# Patient Record
Sex: MALE | Race: BLACK OR AFRICAN AMERICAN | Employment: FULL TIME | URBAN - METROPOLITAN AREA
[De-identification: names, ages, dates, MRNs, and addresses within clinical notes are randomized per-mention and may not be internally consistent; named-entity substitution may affect disease eponyms.]

---

## 2021-04-05 ENCOUNTER — HOSPITAL ENCOUNTER (EMERGENCY)
Age: 41
Discharge: HOME | End: 2021-04-05
Attending: EMERGENCY MEDICINE
Payer: COMMERCIAL

## 2021-04-05 VITALS
HEART RATE: 86 BPM | TEMPERATURE: 97.8 F | OXYGEN SATURATION: 99 % | RESPIRATION RATE: 14 BRPM | SYSTOLIC BLOOD PRESSURE: 136 MMHG | WEIGHT: 204.59 LBS | BODY MASS INDEX: 31.01 KG/M2 | HEIGHT: 68 IN | DIASTOLIC BLOOD PRESSURE: 82 MMHG

## 2021-04-05 DIAGNOSIS — S01.81XA FACIAL LACERATION, INITIAL ENCOUNTER: Primary | ICD-10-CM

## 2021-04-05 PROCEDURE — 99282 EMERGENCY DEPT VISIT SF MDM: CPT

## 2021-04-05 PROCEDURE — 74011000250 HC RX REV CODE- 250: Performed by: EMERGENCY MEDICINE

## 2021-04-05 PROCEDURE — 74011250636 HC RX REV CODE- 250/636: Performed by: EMERGENCY MEDICINE

## 2021-04-05 PROCEDURE — 90471 IMMUNIZATION ADMIN: CPT

## 2021-04-05 PROCEDURE — 90715 TDAP VACCINE 7 YRS/> IM: CPT | Performed by: EMERGENCY MEDICINE

## 2021-04-05 PROCEDURE — 75810000293 HC SIMP/SUPERF WND  RPR

## 2021-04-05 PROCEDURE — 77030002974 HC SUT PLN J&J -A

## 2021-04-05 RX ORDER — LIDOCAINE HYDROCHLORIDE AND EPINEPHRINE 10; 10 MG/ML; UG/ML
1.5 INJECTION, SOLUTION INFILTRATION; PERINEURAL ONCE
Status: COMPLETED | OUTPATIENT
Start: 2021-04-05 | End: 2021-04-05

## 2021-04-05 RX ADMIN — LIDOCAINE HYDROCHLORIDE,EPINEPHRINE BITARTRATE 15 MG: 10; .01 INJECTION, SOLUTION INFILTRATION; PERINEURAL at 13:58

## 2021-04-05 RX ADMIN — TETANUS TOXOID, REDUCED DIPHTHERIA TOXOID AND ACELLULAR PERTUSSIS VACCINE, ADSORBED 0.5 ML: 5; 2.5; 8; 8; 2.5 SUSPENSION INTRAMUSCULAR at 13:58

## 2021-04-05 NOTE — DISCHARGE INSTRUCTIONS
Please keep your sutured wound clean and dry. You may shower after 24 hours. Be sure to dry the wound thoroughly when it is wet. Please avoid submerging the wound such as in bathtubs, hot tubs, swimming pools, the beach, until the sutures have been removed. After the sutures have been removed you may use sunscreen for the next 3 to 6 months to minimize scarring. Please return to the emergency department immediately for signs of infection such as fever, increasing redness, increasing pain, purulent discharge, or other concerns. Your sutures are dissolvable and should fall out approximately 7 days.   If they do not then please return to the emergency department or to primary care in approximately 10 days for wound check and potential suture removal.

## 2021-04-05 NOTE — ED PROVIDER NOTES
52-year-old gentleman without any significant medical history presents to the emergency department today from home after he opened a car door to hearted to his head suffering a small laceration above his left eye. He denies any other injuries or loss of consciousness. His last tetanus shot was approximately 6-7 years ago. The history is provided by the patient and medical records. Laceration   The incident occurred less than 1 hour ago. The laceration is located on the face. The laceration is 1 cm in size. The injury mechanism is a blunt object. Foreign body present: no. The pain is mild. The pain has been constant since onset. Pertinent negatives include no weakness. The patient's last tetanus shot was 5 to 10 years ago. No past medical history on file. No past surgical history on file. No family history on file.     Social History     Socioeconomic History    Marital status: Not on file     Spouse name: Not on file    Number of children: Not on file    Years of education: Not on file    Highest education level: Not on file   Occupational History    Not on file   Social Needs    Financial resource strain: Not on file    Food insecurity     Worry: Not on file     Inability: Not on file    Transportation needs     Medical: Not on file     Non-medical: Not on file   Tobacco Use    Smoking status: Not on file   Substance and Sexual Activity    Alcohol use: Not on file    Drug use: Not on file    Sexual activity: Not on file   Lifestyle    Physical activity     Days per week: Not on file     Minutes per session: Not on file    Stress: Not on file   Relationships    Social connections     Talks on phone: Not on file     Gets together: Not on file     Attends Sikhism service: Not on file     Active member of club or organization: Not on file     Attends meetings of clubs or organizations: Not on file     Relationship status: Not on file    Intimate partner violence     Fear of current or ex partner: Not on file     Emotionally abused: Not on file     Physically abused: Not on file     Forced sexual activity: Not on file   Other Topics Concern    Not on file   Social History Narrative    Not on file         ALLERGIES: Patient has no known allergies. Review of Systems   Constitutional: Negative for fatigue and fever. HENT: Negative for sneezing and sore throat. Respiratory: Negative for cough and shortness of breath. Cardiovascular: Negative for chest pain and leg swelling. Gastrointestinal: Negative for abdominal pain, diarrhea, nausea and vomiting. Genitourinary: Negative for difficulty urinating and dysuria. Musculoskeletal: Negative for arthralgias and myalgias. Skin: Positive for wound. Negative for color change and rash. Neurological: Negative for weakness and headaches. Psychiatric/Behavioral: Negative for agitation and behavioral problems. There were no vitals filed for this visit. Physical Exam  Vitals signs and nursing note reviewed. Constitutional:       General: He is not in acute distress. Appearance: Normal appearance. He is normal weight. He is not ill-appearing, toxic-appearing or diaphoretic. HENT:      Head: Normocephalic. Nose: Nose normal.      Mouth/Throat:      Mouth: Mucous membranes are moist.      Pharynx: Oropharynx is clear. Eyes:      Extraocular Movements: Extraocular movements intact. Conjunctiva/sclera: Conjunctivae normal.      Pupils: Pupils are equal, round, and reactive to light. Neck:      Musculoskeletal: Normal range of motion and neck supple. No muscular tenderness. Cardiovascular:      Rate and Rhythm: Normal rate. Pulses: Normal pulses. Pulmonary:      Effort: Pulmonary effort is normal. No respiratory distress. Musculoskeletal: Normal range of motion. General: No swelling, tenderness, deformity or signs of injury. Right lower leg: No edema. Left lower leg: No edema. Skin:     General: Skin is warm and dry. Neurological:      General: No focal deficit present. Mental Status: He is alert and oriented to person, place, and time. Psychiatric:         Mood and Affect: Mood normal.         Behavior: Behavior normal.          MDM  Number of Diagnoses or Management Options  Diagnosis management comments: 72-year-old gentleman presents as above laceration of his left eye which is repaired primarily in the emergency department. Plan to discharge instruction to follow-up primary care, wound care instructions, return if needed. Wound Repair    Date/Time: 4/5/2021 2:19 PM  Performed by: attendingPreparation: skin prepped with Shur-Clens  Pre-procedure re-eval: Immediately prior to the procedure, the patient was reevaluated and found suitable for the planned procedure and any planned medications.   Location details: face  Wound length:2.5 cm or less  Anesthesia: local infiltration    Anesthesia:  Local Anesthetic: lidocaine 1% with epinephrine  Foreign bodies: no foreign bodies  Irrigation solution: saline  Irrigation method: syringe  Debridement: none  Subcutaneous closure: gut  Number of sutures: 1  Technique: simple  Approximation: close  Patient tolerance: patient tolerated the procedure well with no immediate complications

## 2021-04-05 NOTE — ED NOTES
The patient was discharged home by Dr Esme Sanchez in stable condition. The patient is alert and oriented, in no respiratory distress. The patient's diagnosis, condition and treatment were explained. The patient expressed understanding. A discharge plan has been developed. A  was not involved in the process. Aftercare instructions were given. Pt ambulatory out of the ED.

## 2021-04-05 NOTE — ED TRIAGE NOTES
Pt presents to ED with c/o laceration over left eye. Pt struck his eyebrow with a car door about one hour PTA. Bleeding is controlled or LOC.

## 2022-12-01 ENCOUNTER — OFFICE VISIT (OUTPATIENT)
Dept: ORTHOPEDIC SURGERY | Age: 42
End: 2022-12-01
Payer: COMMERCIAL

## 2022-12-01 DIAGNOSIS — M76.821 POSTERIOR TIBIAL TENDINITIS OF RIGHT LOWER EXTREMITY: Primary | ICD-10-CM

## 2022-12-01 NOTE — LETTER
12/1/2022    Patient: Hawk Ruano   YOB: 1980   Date of Visit: 12/1/2022     Christian Nageotte, [address]  [facility] [phone]  Via Fax: [phone]    Dear Christian Nageotte, MD,      Thank you for referring Mr. Hawk Ruano to [facility] for evaluation. My notes for this consultation are attached. If you have questions, please do not hesitate to call me. I look forward to following your patient along with you.       Sincerely,    Sunny Syed MD

## 2022-12-01 NOTE — PROGRESS NOTES
Pedro Real (: 1980) is a 40 y.o. male patient, here for evaluation of the following chief complaint(s): Ankle Pain (Right ankle pain for years)       ASSESSMENT/PLAN:  Below is the assessment and plan developed based on review of pertinent history, physical exam, labs, studies, and medications. Plan we are going to start him on physical therapy. We will see him back in the office on a as needed basis. 1. Posterior tibial tendinitis of right lower extremity  -     XR ANKLE RT MIN 3 V; Future  -     REFERRAL TO PHYSICAL THERAPY      Return if symptoms worsen or fail to improve. SUBJECTIVE/OBJECTIVE:  Pedro Real (: 1980) is a 40 y.o. male who presents today for the following:  Chief Complaint   Patient presents with    Ankle Pain     Right ankle pain for years       Patient presents the office today complaints of right ankle pain. Ports been going on since Yina started he has had intermittent pain its active up recently is here for further evaluation. IMAGING:    XR Results (most recent):  Results from Appointment encounter on 22    XR ANKLE RT MIN 3 V    Narrative  Radiographs taken the office today include AP lateral and mortise views of the right ankle. These show no evidence of acute fracture, dislocation, or congenital abnormality. No Known Allergies    No current outpatient medications on file. No current facility-administered medications for this visit. History reviewed. No pertinent past medical history. Past Surgical History:   Procedure Laterality Date    HX HEENT         History reviewed. No pertinent family history. Social History     Tobacco Use    Smoking status: Some Days    Smokeless tobacco: Never   Substance Use Topics    Alcohol use: Yes     Alcohol/week: 1.0 - 2.0 standard drink     Types: 1 - 2 Shots of liquor per week        Review of Systems     No flowsheet data found. Vitals:   There were no vitals taken for this visit. There is no height or weight on file to calculate BMI. Physical Exam    Examination patient general shows awake, alert, and oriented. He has no lymphadenopathy. Emanation of the left ankle shows sensation motor intact. There is full pain-free range of motion. There is no tenderness to palpation. There are no skin lesions. There is no gross deformity. There is brisk capillary refill throughout. There is no effusion. There is no edema. There is no tenderness on the medial or lateral ligamentous complexes. There is no tenderness on the tibia or fibula. There is no evidence of instability. Examination of right ankle show sensation motor intact does have tenderness palpation at posterior medial aspect of the ankle on what appears to be the FHL or posterior tibialis tendon. He has no pain with resisted flexion of the great toe lesser toes or plantarflexion and inversion of the foot. He has nonantalgic gait. Does have slight metatarsus adductus bilaterally. An electronic signature was used to authenticate this note.   -- Naun Hubbard MD